# Patient Record
Sex: MALE | Race: WHITE | URBAN - METROPOLITAN AREA
[De-identification: names, ages, dates, MRNs, and addresses within clinical notes are randomized per-mention and may not be internally consistent; named-entity substitution may affect disease eponyms.]

---

## 2017-01-16 RX ORDER — BUPROPION HYDROCHLORIDE 150 MG/1
150 TABLET, EXTENDED RELEASE ORAL DAILY
COMMUNITY

## 2017-01-16 RX ORDER — OMEGA-3 FATTY ACIDS/FISH OIL 300-1000MG
3000 CAPSULE ORAL
COMMUNITY

## 2017-01-16 RX ORDER — GLUCOSAM/CHON-MSM1/C/MANG/BOSW 750-644 MG
TABLET ORAL
COMMUNITY

## 2017-01-16 RX ORDER — LANOLIN ALCOHOL/MO/W.PET/CERES
5 CREAM (GRAM) TOPICAL NIGHTLY PRN
COMMUNITY

## 2017-01-18 ENCOUNTER — HOSPITAL ENCOUNTER (OUTPATIENT)
Dept: SURGERY | Age: 57
Discharge: HOME | End: 2017-01-18
Attending: INTERNAL MEDICINE | Admitting: INTERNAL MEDICINE

## 2017-01-18 VITALS
WEIGHT: 214 LBS | OXYGEN SATURATION: 100 % | DIASTOLIC BLOOD PRESSURE: 89 MMHG | BODY MASS INDEX: 27.46 KG/M2 | RESPIRATION RATE: 16 BRPM | HEIGHT: 74 IN | HEART RATE: 69 BPM | SYSTOLIC BLOOD PRESSURE: 132 MMHG | TEMPERATURE: 97 F

## 2017-01-18 RX ORDER — SODIUM CHLORIDE, SODIUM LACTATE, POTASSIUM CHLORIDE, CALCIUM CHLORIDE 600; 310; 30; 20 MG/100ML; MG/100ML; MG/100ML; MG/100ML
INJECTION, SOLUTION INTRAVENOUS CONTINUOUS
Status: DISCONTINUED | OUTPATIENT
Start: 2017-01-18 | End: 2017-01-19 | Stop reason: HOSPADM

## 2017-01-18 RX ADMIN — SODIUM CHLORIDE, SODIUM LACTATE, POTASSIUM CHLORIDE, CALCIUM CHLORIDE: 600; 310; 30; 20 INJECTION, SOLUTION INTRAVENOUS at 09:29

## 2017-01-18 ASSESSMENT — PAIN SCALES - GENERAL
PAINLEVEL_OUTOF10: 0
PAINLEVEL_OUTOF10: 0

## 2017-01-18 ASSESSMENT — PAIN - FUNCTIONAL ASSESSMENT: PAIN_FUNCTIONAL_ASSESSMENT: 0-10

## 2018-11-08 ENCOUNTER — HOSPITAL ENCOUNTER (EMERGENCY)
Age: 58
Discharge: PSYCHIATRIC OR SUBSTANCE USE FACILITY | End: 2018-11-09
Attending: EMERGENCY MEDICINE

## 2018-11-08 DIAGNOSIS — F29 PSYCHOSIS, UNSPECIFIED PSYCHOSIS TYPE (HCC): Primary | ICD-10-CM

## 2018-11-08 LAB
ACETAMINOPHEN LEVEL: <5 UG/ML (ref 15–30)
ALBUMIN SERPL-MCNC: 5.3 GM/DL (ref 3.4–5)
ALCOHOL SCREEN SERUM: <0.01 %WT/VOL
ALP BLD-CCNC: 79 IU/L (ref 40–129)
ALT SERPL-CCNC: 33 U/L (ref 10–40)
AMPHETAMINES: NEGATIVE
ANION GAP SERPL CALCULATED.3IONS-SCNC: 14 MMOL/L (ref 4–16)
AST SERPL-CCNC: 53 IU/L (ref 15–37)
BARBITURATE SCREEN URINE: NEGATIVE
BASOPHILS ABSOLUTE: 0.1 K/CU MM
BASOPHILS RELATIVE PERCENT: 0.5 % (ref 0–1)
BENZODIAZEPINE SCREEN, URINE: NEGATIVE
BILIRUB SERPL-MCNC: 0.6 MG/DL (ref 0–1)
BUN BLDV-MCNC: 6 MG/DL (ref 6–23)
CALCIUM SERPL-MCNC: 9.9 MG/DL (ref 8.3–10.6)
CANNABINOID SCREEN URINE: NEGATIVE
CHLORIDE BLD-SCNC: 94 MMOL/L (ref 99–110)
CO2: 27 MMOL/L (ref 21–32)
COCAINE METABOLITE: NEGATIVE
CREAT SERPL-MCNC: 1 MG/DL (ref 0.9–1.3)
DIFFERENTIAL TYPE: ABNORMAL
EOSINOPHILS ABSOLUTE: 0 K/CU MM
EOSINOPHILS RELATIVE PERCENT: 0.3 % (ref 0–3)
GFR AFRICAN AMERICAN: >60 ML/MIN/1.73M2
GFR NON-AFRICAN AMERICAN: >60 ML/MIN/1.73M2
GLUCOSE BLD-MCNC: 152 MG/DL (ref 70–99)
HCT VFR BLD CALC: 41.4 % (ref 42–52)
HEMOGLOBIN: 14 GM/DL (ref 13.5–18)
IMMATURE NEUTROPHIL %: 0.4 % (ref 0–0.43)
LYMPHOCYTES ABSOLUTE: 0.9 K/CU MM
LYMPHOCYTES RELATIVE PERCENT: 8.1 % (ref 24–44)
MCH RBC QN AUTO: 32.6 PG (ref 27–31)
MCHC RBC AUTO-ENTMCNC: 33.8 % (ref 32–36)
MCV RBC AUTO: 96.5 FL (ref 78–100)
MONOCYTES ABSOLUTE: 0.6 K/CU MM
MONOCYTES RELATIVE PERCENT: 5.7 % (ref 0–4)
NUCLEATED RBC %: 0 %
OPIATES, URINE: NEGATIVE
OXYCODONE: NEGATIVE
PDW BLD-RTO: 11.9 % (ref 11.7–14.9)
PHENCYCLIDINE, URINE: NEGATIVE
PLATELET # BLD: 233 K/CU MM (ref 140–440)
PMV BLD AUTO: 9.4 FL (ref 7.5–11.1)
POTASSIUM SERPL-SCNC: 3.8 MMOL/L (ref 3.5–5.1)
RBC # BLD: 4.29 M/CU MM (ref 4.6–6.2)
SALICYLATE LEVEL: 0.8 MG/DL (ref 15–30)
SALICYLATE LEVEL: <0.3 MG/DL (ref 15–30)
SEGMENTED NEUTROPHILS ABSOLUTE COUNT: 9.1 K/CU MM
SEGMENTED NEUTROPHILS RELATIVE PERCENT: 85 % (ref 36–66)
SODIUM BLD-SCNC: 135 MMOL/L (ref 135–145)
TOTAL IMMATURE NEUTOROPHIL: 0.04 K/CU MM
TOTAL NUCLEATED RBC: 0 K/CU MM
TOTAL PROTEIN: 8.8 GM/DL (ref 6.4–8.2)
TSH HIGH SENSITIVITY: 0.72 UIU/ML (ref 0.27–4.2)
WBC # BLD: 10.7 K/CU MM (ref 4–10.5)

## 2018-11-08 PROCEDURE — 83721 ASSAY OF BLOOD LIPOPROTEIN: CPT

## 2018-11-08 PROCEDURE — 80061 LIPID PANEL: CPT

## 2018-11-08 PROCEDURE — G0480 DRUG TEST DEF 1-7 CLASSES: HCPCS

## 2018-11-08 PROCEDURE — 99285 EMERGENCY DEPT VISIT HI MDM: CPT

## 2018-11-08 PROCEDURE — 84443 ASSAY THYROID STIM HORMONE: CPT

## 2018-11-08 PROCEDURE — 80053 COMPREHEN METABOLIC PANEL: CPT

## 2018-11-08 PROCEDURE — 36415 COLL VENOUS BLD VENIPUNCTURE: CPT

## 2018-11-08 PROCEDURE — 80307 DRUG TEST PRSMV CHEM ANLYZR: CPT

## 2018-11-08 PROCEDURE — 85025 COMPLETE CBC W/AUTO DIFF WBC: CPT

## 2018-11-09 VITALS
TEMPERATURE: 98.1 F | SYSTOLIC BLOOD PRESSURE: 175 MMHG | HEART RATE: 92 BPM | RESPIRATION RATE: 16 BRPM | DIASTOLIC BLOOD PRESSURE: 99 MMHG | OXYGEN SATURATION: 99 %

## 2018-11-09 LAB
CHOLESTEROL: 160 MG/DL
HDLC SERPL-MCNC: 77 MG/DL
LDL CHOLESTEROL DIRECT: 75 MG/DL
TRIGL SERPL-MCNC: 52 MG/DL

## 2018-11-09 NOTE — ED PROVIDER NOTES
Triage Chief Complaint:   Other (pt here with spd for mental health; denies suicidal thoughts during triage however while with deputy pt wanted deputy to shoot him in the head)      MARINA Pedraza is a 62 y.o. male that presents to the emergency department with a dip ED who has pink slipped him. .  Patient is denying being suicidal or homicidal however the patient was found by an off-duty  who was knocking on the window of this officer's house. States he was waving a Bible shouting at the officer that he needed to \"go see present Blair Giles and tell him that he is antichrist and that the world is going to end at 3 AM.\"  While the officer brought him in patient was making statements to the officer to shoot him in the head. Patient is currently calm and cooperative. Father's now bedside and states patient has-been acting strangely recently but does not have any history of drug abuse or mental health history. Past Medical History:   Diagnosis Date    Genital herpes      Past Surgical History:   Procedure Laterality Date    COLONOSCOPY  01/01/2016    2 polyps, int hem, repeat in 5 years    FRACTURE SURGERY      Nose     History reviewed. No pertinent family history. Social History     Social History    Marital status:      Spouse name: N/A    Number of children: N/A    Years of education: N/A     Occupational History    Not on file. Social History Main Topics    Smoking status: Former Smoker     Packs/day: 0.50     Types: Cigarettes    Smokeless tobacco: Never Used    Alcohol use Yes      Comment: occasionally    Drug use: Yes     Types: Marijuana      Comment: occasionally (last used 2 weeks ago)    Sexual activity: Not on file     Other Topics Concern    Not on file     Social History Narrative    No narrative on file     No current facility-administered medications for this encounter.       Current Outpatient Prescriptions   Medication Sig Dispense Refill  naproxen sodium (ANAPROX) 550 MG tablet Take 550 mg by mouth 2 times daily (with meals)      tiZANidine (ZANAFLEX) 4 MG tablet Take 4 mg by mouth every 6 hours as needed      buPROPion (WELLBUTRIN SR) 150 MG extended release tablet Take 150 mg by mouth daily      Omega 3 1000 MG CAPS Take 3,000 mg by mouth      Cholecalciferol (VITAMIN D3) 5000 UNITS TABS Take by mouth      UNABLE TO FIND Indications: Nature's Bounty Mens 45+ Multi Vitamin      Misc Natural Products (OSTEO BI-FLEX ADV TRIPLE ST) TABS Take by mouth      Saw Palmetto, Serenoa repens, 1000 MG CAPS Take 1,800 mg by mouth      Lactobacillus (PROBIOTIC ACIDOPHILUS PO) Take by mouth      melatonin 3 MG TABS tablet Take 5 mg by mouth nightly as needed       Allergies   Allergen Reactions    Shrimp (Diagnostic) Other (See Comments)     Showed up with allergy testing     Nursing Notes Reviewed    ROS:  At least 10 systems reviewed and otherwise negative except as in the [place]. Physical Exam:  ED Triage Vitals   Enc Vitals Group      BP       Pulse       Resp       Temp       Temp src       SpO2       Weight       Height       Head Circumference       Peak Flow       Pain Score       Pain Loc       Pain Edu? Excl. in [address]? My pulse oximetry interpretation is which is within the normal range    GENERAL APPEARANCE: Awake and alert. Cooperative. No acute distress. HEAD: Normocephalic. Atraumatic. EYES: EOM's grossly intact. Sclera anicteric. ENT: Mucous membranes are moist. Tolerates saliva. No trismus. NECK: Supple. No meningismus. Trachea midline. HEART: RRR. Radial pulses 2+. LUNGS: Respirations unlabored. CTAB  ABDOMEN: Soft. Non-tender. No guarding or rebound. EXTREMITIES: No acute deformities. SKIN: Warm and dry. NEUROLOGICAL: No gross facial drooping. Moves all 4 extremities spontaneously. PSYCHIATRIC: flat affect. Denying SI and HI.      I have reviewed and interpreted all of the currently available lab results from this

## 2018-11-09 NOTE — ED NOTES
Political and Gnosticism preoccupation, rapid speech, inappropriate affect and unable to read social cues while conversing with sitter.       Billy Farmer RN  11/09/18 0281

## 2018-11-09 NOTE — ED NOTES
Pt sitting in bed talking to Sitter that the world will end tonight, Siter at bedside 1:1 as per policy. Requests water at this time. No further needs or concerns.      Belinda Agrawal RN  11/08/18 1385

## 2018-11-09 NOTE — ED NOTES
0100 - 0120 - Chart faxed to [facility] for review and possible admission with confirmation of receipt after speaking with Ratna MEI. They have open beds.      Sebastian Israel RN  88/60/37 [phone]

## 2018-11-09 NOTE — ED NOTES
ER room staff with pt from the time that he got here with CHIN Mcdonnell Handler, [facility]  11/08/18 7515

## 2018-11-09 NOTE — ED NOTES
Pt resting in bed; no signs of distress noted; sitter remains in room for pts safety       [facility]  11/08/18 8520

## 2018-11-09 NOTE — ED NOTES
Report given to [facility]; pt resting in room no signs distress noted; sitter remains at bedside for pts safety       Lulu Valencia, [place]  11/08/18 1774

## 2018-11-10 ENCOUNTER — HOSPITAL ENCOUNTER (OUTPATIENT)
Age: 58
Setting detail: SPECIMEN
Discharge: HOME | End: 2018-11-10

## 2018-11-11 ENCOUNTER — HOSPITAL ENCOUNTER (OUTPATIENT)
Age: 58
Setting detail: SPECIMEN
Discharge: HOME | End: 2018-11-11

## 2018-11-11 LAB
ESTIMATED AVERAGE GLUCOSE: 103 MG/DL
HBA1C MFR BLD: 5.2 % (ref 4.2–6.3)

## 2018-11-11 PROCEDURE — 83036 HEMOGLOBIN GLYCOSYLATED A1C: CPT

## 2018-11-11 PROCEDURE — 36415 COLL VENOUS BLD VENIPUNCTURE: CPT

## 2018-11-13 ENCOUNTER — HOSPITAL ENCOUNTER (OUTPATIENT)
Age: 58
Setting detail: SPECIMEN
Discharge: HOME | End: 2018-11-13

## 2018-11-13 LAB
ALBUMIN SERPL-MCNC: 4.5 GM/DL (ref 3.4–5)
ALP BLD-CCNC: 57 IU/L (ref 40–129)
ALT SERPL-CCNC: 26 U/L (ref 10–40)
AST SERPL-CCNC: 25 IU/L (ref 15–37)
BILIRUB SERPL-MCNC: 0.6 MG/DL (ref 0–1)
BILIRUBIN DIRECT: 0.2 MG/DL (ref 0–0.3)
BILIRUBIN, INDIRECT: 0.4 MG/DL (ref 0–0.7)
TOTAL PROTEIN: 6.7 GM/DL (ref 6.4–8.2)

## 2018-11-13 PROCEDURE — 36415 COLL VENOUS BLD VENIPUNCTURE: CPT

## 2018-11-13 PROCEDURE — 80076 HEPATIC FUNCTION PANEL: CPT

## 2020-02-08 ENCOUNTER — APPOINTMENT (OUTPATIENT)
Dept: GENERAL RADIOLOGY | Age: 60
End: 2020-02-08

## 2020-02-08 ENCOUNTER — HOSPITAL ENCOUNTER (EMERGENCY)
Age: 60
Discharge: HOME | End: 2020-02-08

## 2020-02-08 VITALS
HEIGHT: 73 IN | DIASTOLIC BLOOD PRESSURE: 100 MMHG | WEIGHT: 200 LBS | TEMPERATURE: 98.2 F | BODY MASS INDEX: 26.51 KG/M2 | RESPIRATION RATE: 18 BRPM | HEART RATE: 87 BPM | SYSTOLIC BLOOD PRESSURE: 149 MMHG | OXYGEN SATURATION: 99 %

## 2020-02-08 PROCEDURE — 4500000029 HC MAJOR PROCEDURE

## 2020-02-08 PROCEDURE — 2500000003 HC RX 250 WO HCPCS: Performed by: PHYSICIAN ASSISTANT

## 2020-02-08 PROCEDURE — 99283 EMERGENCY DEPT VISIT LOW MDM: CPT

## 2020-02-08 PROCEDURE — 73130 X-RAY EXAM OF HAND: CPT

## 2020-02-08 RX ORDER — LIDOCAINE HYDROCHLORIDE 20 MG/ML
5 INJECTION, SOLUTION EPIDURAL; INFILTRATION; INTRACAUDAL; PERINEURAL ONCE
Status: COMPLETED | OUTPATIENT
Start: 2020-02-08 | End: 2020-02-08

## 2020-02-08 RX ORDER — HYDROCODONE BITARTRATE AND ACETAMINOPHEN 5; 325 MG/1; MG/1
1 TABLET ORAL EVERY 8 HOURS PRN
Qty: 6 TABLET | Refills: 0 | Status: SHIPPED | OUTPATIENT
Start: 2020-02-08 | End: 2020-02-10

## 2020-02-08 RX ADMIN — LIDOCAINE HYDROCHLORIDE 5 ML: 20 INJECTION, SOLUTION EPIDURAL; INFILTRATION; INTRACAUDAL; PERINEURAL at 21:52

## 2020-02-08 ASSESSMENT — PAIN SCALES - GENERAL: PAINLEVEL_OUTOF10: 2

## 2020-02-08 ASSESSMENT — PAIN DESCRIPTION - PAIN TYPE: TYPE: ACUTE PAIN

## 2020-02-08 ASSESSMENT — PAIN DESCRIPTION - LOCATION: LOCATION: FINGER (COMMENT WHICH ONE)

## 2020-02-08 ASSESSMENT — PAIN DESCRIPTION - ORIENTATION: ORIENTATION: LEFT

## 2020-02-09 NOTE — ED PROVIDER NOTES
CAPS Take 3,000 mg by mouth      Cholecalciferol (VITAMIN D3) 5000 UNITS TABS Take by mouth      UNABLE TO FIND Indications: Nature's Bounty Mens 45+ Multi Vitamin      Misc Natural Products (OSTEO BI-FLEX ADV TRIPLE ST) TABS Take by mouth      Saw Palmettsyed Serenoa repens, 1000 MG CAPS Take 1,800 mg by mouth      Lactobacillus (PROBIOTIC ACIDOPHILUS PO) Take by mouth      melatonin 3 MG TABS tablet Take 5 mg by mouth nightly as needed         ALLERGIES    Allergies   Allergen Reactions    Shrimp (Diagnostic) Other (See Comments)     Showed up with allergy testing       SOCIAL & FAMILY HISTORY    Social History     Socioeconomic History    Marital status:      Spouse name: None    Number of children: None    Years of education: None    Highest education level: None   Occupational History    None   Social Needs    Financial resource strain: None    Food insecurity:     Worry: None     Inability: None    Transportation needs:     Medical: None     Non-medical: None   Tobacco Use    Smoking status: Former Smoker     Packs/day: 0.50     Types: Cigarettes    Smokeless tobacco: Never Used   Substance and Sexual Activity    Alcohol use: Yes     Comment: 3 times a week     Drug use: Yes     Types: Marijuana     Comment: denies     Sexual activity: None   Lifestyle    Physical activity:     Days per week: None     Minutes per session: None    Stress: None   Relationships    Social connections:     Talks on phone: None     Gets together: None     Attends Christianity service: None     Active member of club or organization: None     Attends meetings of clubs or organizations: None     Relationship status: None    Intimate partner violence:     Fear of current or ex partner: None     Emotionally abused: None     Physically abused: None     Forced sexual activity: None   Other Topics Concern    None   Social History Narrative    None     History reviewed. No pertinent family history. performed using traction at bedside.  - The affected area was immobilized with a alumiform finger splint  -Patient instantly regained range of motion of the finger.  - A post-reduction exam revealed distal perfusion & neurologic function to be normal.   The patient tolerated the procedure well. Complications: none    Instructions were given to return immediately for increasing pain, new numbness or weakness, a cold/pale extremity or any other worsening or worrisome concerns. ________________________________________________________________________        PROCEDURES   SPLINT PLACEMENT     A finger splint splint was applied by the emergency department technician. The splint is in good position. The patient's extremity is neurovascularly intact after the splint placement. ED COURSE & MEDICAL DECISION MAKING        Patient presents as above for painful, swollen and immobile pinky after fall on Thursday. Vital signs stable. On exam patient is unable to range finger. X-ray reveals acute dorsal dislocation of fifth PIP joint. See note above for finger joint reduction. Patient instantly had return of range of motion of his finger. He was very amazed and happy with the outcome. Patient was placed in a finger splint today. Pain medication provided. Followup with orthopedist-information provided today. Patient understands and agrees to this plan of care. Ample time was given for answering questions and return precautions were reviewed at length. Patient agrees to return emergency department if symptoms worsen or any new symptoms develop. Differential diagnosis: includes but not limited to ligamentous injury, tendon injury, soft tissue contusion/hematoma, fracture, dislocation, Infection, Neurologic Deficit/Injury. Clinical  IMPRESSION    1. Fall, initial encounter    2.  Dislocation of finger, initial encounter          Comment: Please note this report has been produced using speech